# Patient Record
Sex: FEMALE | Race: WHITE | NOT HISPANIC OR LATINO | Employment: STUDENT | URBAN - METROPOLITAN AREA
[De-identification: names, ages, dates, MRNs, and addresses within clinical notes are randomized per-mention and may not be internally consistent; named-entity substitution may affect disease eponyms.]

---
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
## 2018-04-22 PROBLEM — F41.9 ANXIETY AND DEPRESSION: Status: ACTIVE | Noted: 2018-04-22

## 2018-04-22 PROBLEM — F32.A ANXIETY AND DEPRESSION: Status: ACTIVE | Noted: 2018-04-22

## 2021-05-10 ENCOUNTER — PATIENT MESSAGE (OUTPATIENT)
Dept: RESEARCH | Facility: HOSPITAL | Age: 21
End: 2021-05-10

## 2023-08-01 ENCOUNTER — TELEPHONE (OUTPATIENT)
Age: 23
End: 2023-08-01

## 2023-08-01 NOTE — TELEPHONE ENCOUNTER
Left  [phone] for pt to reschedule appt she has 9/19 due to sytem error in Dr. Jn Marvin schedule. -TM 8/1/23

## 2023-08-03 ENCOUNTER — TELEPHONE (OUTPATIENT)
Age: 23
End: 2023-08-03

## 2023-08-03 NOTE — TELEPHONE ENCOUNTER
Left  [phone] for pt to reschedule appt 9/19 due to sytem issue with Dr. Ulloa Search that day. Please schedule pt next available. -TM 8/3/23